# Patient Record
Sex: MALE | Race: WHITE | NOT HISPANIC OR LATINO | Employment: UNEMPLOYED | URBAN - METROPOLITAN AREA
[De-identification: names, ages, dates, MRNs, and addresses within clinical notes are randomized per-mention and may not be internally consistent; named-entity substitution may affect disease eponyms.]

---
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
## 2024-01-03 ENCOUNTER — PATIENT OUTREACH (OUTPATIENT)
Dept: ADMINISTRATIVE | Facility: HOSPITAL | Age: 24
End: 2024-01-03
Payer: COMMERCIAL

## 2024-01-31 ENCOUNTER — OFFICE VISIT (OUTPATIENT)
Dept: INTERNAL MEDICINE | Facility: CLINIC | Age: 24
End: 2024-01-31
Payer: COMMERCIAL

## 2024-01-31 VITALS
SYSTOLIC BLOOD PRESSURE: 139 MMHG | WEIGHT: 189.63 LBS | BODY MASS INDEX: 27.15 KG/M2 | DIASTOLIC BLOOD PRESSURE: 79 MMHG | HEART RATE: 63 BPM | HEIGHT: 70 IN | OXYGEN SATURATION: 99 %

## 2024-01-31 DIAGNOSIS — F90.0 ATTENTION DEFICIT HYPERACTIVITY DISORDER (ADHD), PREDOMINANTLY INATTENTIVE TYPE: ICD-10-CM

## 2024-01-31 DIAGNOSIS — Z00.00 ROUTINE HEALTH MAINTENANCE: Primary | ICD-10-CM

## 2024-01-31 PROCEDURE — 3078F DIAST BP <80 MM HG: CPT | Mod: CPTII,S$GLB,, | Performed by: FAMILY MEDICINE

## 2024-01-31 PROCEDURE — 1159F MED LIST DOCD IN RCRD: CPT | Mod: CPTII,S$GLB,, | Performed by: FAMILY MEDICINE

## 2024-01-31 PROCEDURE — 3008F BODY MASS INDEX DOCD: CPT | Mod: CPTII,S$GLB,, | Performed by: FAMILY MEDICINE

## 2024-01-31 PROCEDURE — 99385 PREV VISIT NEW AGE 18-39: CPT | Mod: S$GLB,,, | Performed by: FAMILY MEDICINE

## 2024-01-31 PROCEDURE — 99999 PR PBB SHADOW E&M-EST. PATIENT-LVL III: CPT | Mod: PBBFAC,,, | Performed by: FAMILY MEDICINE

## 2024-01-31 PROCEDURE — 3075F SYST BP GE 130 - 139MM HG: CPT | Mod: CPTII,S$GLB,, | Performed by: FAMILY MEDICINE

## 2024-01-31 RX ORDER — METHYLPHENIDATE HYDROCHLORIDE 10 MG/1
10 TABLET ORAL 2 TIMES DAILY
Qty: 30 TABLET | Refills: 0 | Status: SHIPPED | OUTPATIENT
Start: 2024-01-31 | End: 2024-04-01 | Stop reason: SDUPTHER

## 2024-01-31 RX ORDER — METHYLPHENIDATE HYDROCHLORIDE 72 MG/1
1 TABLET, EXTENDED RELEASE ORAL DAILY
Qty: 30 TABLET | Refills: 0 | Status: SHIPPED | OUTPATIENT
Start: 2024-01-31 | End: 2024-02-29

## 2024-01-31 NOTE — PROGRESS NOTES
"Subjective:       Patient ID: Efraín Curtis is a 23 y.o. male.    Chief Complaint: Establish Care    HPI  Came in today to establish care with me.  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  Has had ADHD in on treatment since he was 14 years old.  He originally is from the state of [place].  Was seen a pediatrician there.    Has taken extended-release methylphenidate on a daily basis and at times will do an as needed lower dose of immediate release methylphenidate.    All of your core healthy metrics are met.      Social History     Social History Narrative    Not on file       History reviewed. No pertinent family history.    Current Outpatient Medications:     methylphenidate HCl (RITALIN) 10 MG tablet, Take 1 tablet (10 mg total) by mouth 2 (two) times daily., Disp: 30 tablet, Rfl: 0    methylphenidate HCl 72 mg TR24, Take 1 each by mouth once daily., Disp: 30 tablet, Rfl: 0    Review of Systems   Constitutional:  Negative for chills and fever.   Eyes:  Negative for visual disturbance.   Respiratory:  Negative for cough and shortness of breath.    Cardiovascular:  Negative for chest pain.   Gastrointestinal:  Negative for abdominal pain.   Neurological:  Negative for dizziness.       Objective:   /79 (BP Location: Left arm, Patient Position: Sitting)   Pulse 63   Ht 5' 10" (1.778 m)   Wt 86 kg (189 lb 9.5 oz)   SpO2 99%   BMI 27.20 kg/m²      Physical Exam  Vitals reviewed.   Constitutional:       Appearance: He is well-developed.   HENT:      Head: Normocephalic and atraumatic.   Eyes:      Conjunctiva/sclera: Conjunctivae normal.   Cardiovascular:      Rate and Rhythm: Normal rate.   Pulmonary:      Effort: Pulmonary effort is normal. No respiratory distress.   Skin:     General: Skin is warm and dry.      Findings: No rash.   Neurological:      Mental Status: He is alert and oriented to person, place, and time.      Coordination: Coordination normal.   Psychiatric:         Behavior: Behavior normal.       "   Assessment & Plan     Problem List Items Addressed This Visit          Psychiatric    Attention deficit hyperactivity disorder (ADHD), predominantly inattentive type    Current Assessment & Plan     Continue on medications that patient reports he was taking and prescribed by pediatrician in [place]..  I requested last progress note from that clinic.         Relevant Medications    methylphenidate HCl 72 mg TR24    methylphenidate HCl (RITALIN) 10 MG tablet       Other    Routine health maintenance - Primary    Current Assessment & Plan     No other health concerns at this time.  Ordered routine labs that he deferred for now.         Relevant Orders    Hepatitis C Antibody    HIV 1/2 Ag/Ab (4th Gen)    Lipid Panel    Comprehensive Metabolic Panel    CBC Auto Differential         Immunizations Administered on Date of Encounter - 1/31/2024       No immunizations on file.             No follow-ups on file.      Disclaimer:  This note may have been prepared using voice recognition software, it may have not been extensively proofed, as such there could be errors within the text such as sound alike errors.

## 2024-01-31 NOTE — ASSESSMENT & PLAN NOTE
Continue on medications that patient reports he was taking and prescribed by pediatrician in [place]..  I requested last progress note from that clinic.

## 2024-02-29 DIAGNOSIS — F90.0 ATTENTION DEFICIT HYPERACTIVITY DISORDER (ADHD), PREDOMINANTLY INATTENTIVE TYPE: ICD-10-CM

## 2024-02-29 RX ORDER — METHYLPHENIDATE HYDROCHLORIDE 72 MG/1
1 TABLET, EXTENDED RELEASE ORAL 2 TIMES DAILY
Qty: 60 TABLET | Refills: 0 | Status: CANCELLED | OUTPATIENT
Start: 2024-02-29

## 2024-02-29 RX ORDER — METHYLPHENIDATE HYDROCHLORIDE 36 MG/1
36 TABLET ORAL 2 TIMES DAILY
Qty: 60 TABLET | Refills: 0 | Status: SHIPPED | OUTPATIENT
Start: 2024-02-29 | End: 2024-03-28 | Stop reason: SDUPTHER

## 2024-02-29 NOTE — TELEPHONE ENCOUNTER
No care due was identified.  [facility] Embedded Care Due Messages. Reference number: [id number].   2/29/2024 1:06:10 PM CST

## 2024-03-05 ENCOUNTER — TELEPHONE (OUTPATIENT)
Dept: INTERNAL MEDICINE | Facility: CLINIC | Age: 24
End: 2024-03-05
Payer: COMMERCIAL

## 2024-03-05 DIAGNOSIS — Z11.1 SCREENING EXAMINATION FOR PULMONARY TUBERCULOSIS: Primary | ICD-10-CM

## 2024-03-05 NOTE — TELEPHONE ENCOUNTER
----- Message from Darlene Cm sent at 3/4/2024  6:57 PM CST -----  Type:  Sooner Apoointment Request    Caller is requesting a sooner appointment.  Caller declined first available appointment listed below.  Caller will not accept being placed on the waitlist and is requesting a message be sent to doctor.  Name of Caller:pt  Would the patient rather a call back or a response via [facility]? call  Best Call Back Number: [phone]  Additional Information: tb test

## 2024-03-05 NOTE — TELEPHONE ENCOUNTER
----- Message from Raven Tiwari sent at 3/5/2024  9:33 AM CST -----  Regarding: Returning call  Type:  Patient Returning Call    Who Called: p/t     Who Left Message for Patient:  gallitojorge nurson     Does the patient know what this is regarding?: yes    Would the patient rather a call back or a response via [facility]?     Best Call Back Number: [phone]    Additional Information:

## 2024-03-05 NOTE — TELEPHONE ENCOUNTER
Called pt back   Spoke w/ pt  He requested a TB blood test  Pended/routing to provider to sign then will contact pt to sched  They verbalized understanding and had no further concerns or questions.

## 2024-03-20 ENCOUNTER — TELEPHONE (OUTPATIENT)
Dept: INTERNAL MEDICINE | Facility: CLINIC | Age: 24
End: 2024-03-20
Payer: COMMERCIAL

## 2024-03-21 ENCOUNTER — LAB VISIT (OUTPATIENT)
Dept: LAB | Facility: HOSPITAL | Age: 24
End: 2024-03-21
Attending: FAMILY MEDICINE
Payer: COMMERCIAL

## 2024-03-21 DIAGNOSIS — Z00.00 ROUTINE HEALTH MAINTENANCE: ICD-10-CM

## 2024-03-21 PROCEDURE — 86480 TB TEST CELL IMMUN MEASURE: CPT | Performed by: FAMILY MEDICINE

## 2024-03-22 LAB
GAMMA INTERFERON BACKGROUND BLD IA-ACNC: 0 IU/ML
M TB IFN-G CD4+ BCKGRND COR BLD-ACNC: 0.01 IU/ML
M TB IFN-G CD4+ BCKGRND COR BLD-ACNC: 0.03 IU/ML
MITOGEN IGNF BCKGRD COR BLD-ACNC: 10 IU/ML
TB GOLD PLUS: NEGATIVE

## 2024-03-28 DIAGNOSIS — F90.0 ATTENTION DEFICIT HYPERACTIVITY DISORDER (ADHD), PREDOMINANTLY INATTENTIVE TYPE: Primary | ICD-10-CM

## 2024-03-29 NOTE — TELEPHONE ENCOUNTER
No care due was identified.  [facility] Embedded Care Due Messages. Reference number: [id number].   3/28/2024 7:35:19 PM CDT

## 2024-04-01 RX ORDER — METHYLPHENIDATE HYDROCHLORIDE 36 MG/1
36 TABLET ORAL 2 TIMES DAILY
Qty: 60 TABLET | Refills: 0 | Status: SHIPPED | OUTPATIENT
Start: 2024-04-01 | End: 2024-05-09 | Stop reason: SDUPTHER

## 2024-05-06 PROBLEM — Z00.00 ROUTINE HEALTH MAINTENANCE: Status: RESOLVED | Noted: 2024-01-31 | Resolved: 2024-05-06

## 2024-05-09 DIAGNOSIS — F90.0 ATTENTION DEFICIT HYPERACTIVITY DISORDER (ADHD), PREDOMINANTLY INATTENTIVE TYPE: ICD-10-CM

## 2024-05-09 RX ORDER — METHYLPHENIDATE HYDROCHLORIDE 36 MG/1
36 TABLET ORAL 2 TIMES DAILY
Qty: 60 TABLET | Refills: 0 | Status: SHIPPED | OUTPATIENT
Start: 2024-05-09 | End: 2024-06-05 | Stop reason: SDUPTHER

## 2024-06-05 DIAGNOSIS — F90.0 ATTENTION DEFICIT HYPERACTIVITY DISORDER (ADHD), PREDOMINANTLY INATTENTIVE TYPE: ICD-10-CM

## 2024-06-05 RX ORDER — METHYLPHENIDATE HYDROCHLORIDE 36 MG/1
36 TABLET ORAL 2 TIMES DAILY
Qty: 60 TABLET | Refills: 0 | Status: SHIPPED | OUTPATIENT
Start: 2024-06-05

## 2024-06-05 NOTE — TELEPHONE ENCOUNTER
No care due was identified.  [facility] Embedded Care Due Messages. Reference number: [id number].   6/05/2024 8:46:51 AM CDT

## 2024-06-10 ENCOUNTER — PATIENT MESSAGE (OUTPATIENT)
Dept: INTERNAL MEDICINE | Facility: CLINIC | Age: 24
End: 2024-06-10
Payer: COMMERCIAL

## 2024-07-01 ENCOUNTER — PATIENT MESSAGE (OUTPATIENT)
Dept: INTERNAL MEDICINE | Facility: CLINIC | Age: 24
End: 2024-07-01
Payer: COMMERCIAL

## 2024-07-01 DIAGNOSIS — F90.0 ATTENTION DEFICIT HYPERACTIVITY DISORDER (ADHD), PREDOMINANTLY INATTENTIVE TYPE: ICD-10-CM

## 2024-07-01 RX ORDER — METHYLPHENIDATE HYDROCHLORIDE 36 MG/1
36 TABLET ORAL 2 TIMES DAILY
Qty: 60 TABLET | Refills: 0 | Status: SHIPPED | OUTPATIENT
Start: 2024-07-01

## 2024-07-01 NOTE — TELEPHONE ENCOUNTER
No care due was identified.  [facility] Embedded Care Due Messages. Reference number: [id number].   7/01/2024 10:41:40 AM CDT

## 2024-08-23 DIAGNOSIS — F90.0 ATTENTION DEFICIT HYPERACTIVITY DISORDER (ADHD), PREDOMINANTLY INATTENTIVE TYPE: ICD-10-CM

## 2024-08-23 RX ORDER — METHYLPHENIDATE HYDROCHLORIDE 36 MG/1
36 TABLET ORAL 2 TIMES DAILY
Qty: 60 TABLET | Refills: 0 | Status: SHIPPED | OUTPATIENT
Start: 2024-08-23

## 2024-09-06 ENCOUNTER — PATIENT MESSAGE (OUTPATIENT)
Dept: ADMINISTRATIVE | Facility: OTHER | Age: 24
End: 2024-09-06
Payer: COMMERCIAL

## 2024-09-06 ENCOUNTER — PATIENT MESSAGE (OUTPATIENT)
Dept: INTERNAL MEDICINE | Facility: CLINIC | Age: 24
End: 2024-09-06
Payer: COMMERCIAL

## 2024-09-06 RX ORDER — METHYLPHENIDATE HYDROCHLORIDE 10 MG/1
10 TABLET ORAL 2 TIMES DAILY
Qty: 30 TABLET | Refills: 0 | Status: SHIPPED | OUTPATIENT
Start: 2024-09-06

## 2024-09-06 NOTE — TELEPHONE ENCOUNTER
No care due was identified.  [facility] Embedded Care Due Messages. Reference number: [id number].   9/06/2024 11:32:33 AM CDT

## 2024-09-07 ENCOUNTER — ON-DEMAND VIRTUAL (OUTPATIENT)
Dept: URGENT CARE | Facility: CLINIC | Age: 24
End: 2024-09-07
Payer: COMMERCIAL

## 2024-09-07 DIAGNOSIS — U07.1 COVID-19: Primary | ICD-10-CM

## 2024-09-07 PROCEDURE — 99214 OFFICE O/P EST MOD 30 MIN: CPT | Mod: 95,,, | Performed by: NURSE PRACTITIONER

## 2024-09-07 RX ORDER — NIRMATRELVIR AND RITONAVIR 300-100 MG
KIT ORAL
Qty: 30 TABLET | Refills: 0 | Status: SHIPPED | OUTPATIENT
Start: 2024-09-07

## 2024-09-07 NOTE — PROGRESS NOTES
Subjective:      Patient ID: Efraín Curtis is a 23 y.o. male.    Vitals:  vitals were not taken for this visit.     Chief Complaint: COVID-19 Concerns      Visit Type: TELE AUDIOVISUAL    Present with the patient at the time of consultation: TELEMED PRESENT WITH PATIENT: None, at home    History reviewed. No pertinent past medical history.  History reviewed. No pertinent surgical history.  Review of patient's allergies indicates:  No Known Allergies  Current Outpatient Medications on File Prior to Visit   Medication Sig Dispense Refill    methylphenidate HCl (RITALIN) 10 MG tablet Take 1 tablet (10 mg total) by mouth 2 (two) times daily. 30 tablet 0    methylphenidate HCl 36 MG CR tablet Take 1 tablet (36 mg total) by mouth 2 (two) times a day. 60 tablet 0     No current facility-administered medications on file prior to visit.     No family history on file.    Medications Ordered                [facility]/pharmacy #[id number] - [place], [place] - [address]   [address] [address]    Telephone: [phone]   Fax: [phone]   Hours: Not open 24 hours                         E-Prescribed (1 of 1)              nirmatrelvir-ritonavir (PAXLOVID) 300 mg (150 mg x 2)-100 mg copackaged tablets (EUA)    Sig: Take 3 tablets by mouth 2 (two) times daily. Each dose contains 2 nirmatrelvir (pink tablets) and 1 ritonavir (white tablet). Take all 3 tablets together       Start: 9/7/24     Quantity: 30 tablet Refills: 0                           [facility] Peq Odvv Intake    9/7/2024 11:59 AM CDT - Filed by Patient   What is your current physical address in the event of a medical emergency? [address] [place], [place], [address]   Are you able to take your vital signs? No   Please attach any relevant images or files          COVID positive. Symptom onset 3 days. Taking Tylenol. Seeking further treatment.        Constitution: Positive for chills and fever.   HENT:  Negative for ear pain, congestion, postnasal drip and sore  throat.    Cardiovascular:  Positive for sob on exertion.   Respiratory:  Positive for cough and shortness of breath. Negative for wheezing.    Gastrointestinal:  Positive for nausea and vomiting (post-tussive).   Musculoskeletal:  Positive for muscle ache.        Objective:   The physical exam was conducted virtually.  Physical Exam   Constitutional: He is oriented to person, place, and time. He does not appear ill. No distress.   HENT:   Head: Normocephalic and atraumatic.   Nose: Nose normal.   Eyes: Extraocular movement intact   Pulmonary/Chest: Effort normal.   Abdominal: Normal appearance.   Musculoskeletal: Normal range of motion.         General: Normal range of motion.   Neurological: no focal deficit. He is alert and oriented to person, place, and time.   Psychiatric: His behavior is normal. Mood normal.   Vitals reviewed.      Assessment:     1. COVID-19        Plan:   Patient encouraged to monitor symptoms closely and instructed to follow-up for new or worsening symptoms. Further, in-person, evaluation may be necessary for continued treatment. Please follow up with your primary care doctor or specialist as needed. Verbally discussed plan. Patient confirms understanding and is in agreement with treatment and plan.     You must understand that you've received a Virtual Care evaluation only and that you may be released before all your medical problems are known or treated. You, the patient, will arrange for follow up care as instructed.      COVID-19  -     nirmatrelvir-ritonavir (PAXLOVID) 300 mg (150 mg x 2)-100 mg copackaged tablets (EUA); Take 3 tablets by mouth 2 (two) times daily. Each dose contains 2 nirmatrelvir (pink tablets) and 1 ritonavir (white tablet). Take all 3 tablets together  Dispense: 30 tablet; Refill: 0      Patient Instructions       Antiviral medication discussed as alternate treatment. Risks and benefits discussed. Side effects discussed. Medical history reviewed with patient, and  medications reviewed for interactions. Patient has no other contraindications to taking this medication. Patient has agreed to taking Paxlovid and prescription was sent. You have 5 days from symptom onset to start the medication for maximum benefit. Recommend follow-up with Primary Care for continued care.      OVER THE COUNTER RECOMMENDATIONS/SUGGESTIONS.      ·         Make sure to stay well hydrated.     ·         Use Nasal Saline to mechanically move any post nasal drip from your eustachian tube or from the back of your throat.     ·         Use warm saltwater gargles to ease your throat pain. Warm saltwater gargles as needed for sore throat-  1/2 tsp salt to 1 cup warm water, gargle as desired.     ·         Use an antihistamine such as Claritin, Zyrtec or Allegra to dry you out.     ·         Use pseudoephedrine (behind the counter) to decongest. Pseudoephedrine  30 mg up to 240 mg /day. It can raise your blood pressure and give you palpitations.     ·         Use Mucinex (guaifenisin) to break up mucous up to 2400mg/day to loosen any mucous.     ·         The Mucinex DM pill has a cough suppressant that can be sedating. It can be used at night to stop the tickle at the back of your throat.     ·         You can use Mucinex D (it has guaifenesin and a high dose of pseudoephedrine) in the mornings to help decongest.     ·         Use Afrin (oxymetazoline) in each nare for no longer than 3 days, as it is addictive. It can also dry out your mucous membranes and cause elevated blood pressure. This is especially useful if you are flying.     ·         Use Flonase 1-2 sprays/nostril per day. It is a local acting steroid nasal spray, if you develop a bloody nose, stop using the medication immediately.     ·         Sometimes Nyquil at night is beneficial to help you get some rest, however it is sedating, and it does have an antihistamine, and Tylenol.     ·         Honey is a natural cough suppressant that can be  used.     ·         Tylenol up to 4,000 mg a day is safe for short periods and can be used for body aches, pain, and fever. However, in high doses and prolonged use it can cause liver irritation.     ·         Ibuprofen is a non-steroidal anti-inflammatory that can be used for body aches, pain, and fever. However, it can also cause stomach irritation if overused.     Here are some useful tips:     COVID-19 Self Care and Symptom Monitoring Program Now Available in [facility]     For community members seeking a COVID-19 test, we strongly recommend at-home testing, whether they are experiencing symptoms or want peace of mind knowing their COVID status. At-home tests can be purchased at major retailers and some local health departments are also giving away at-home testing kits to residents. The rapid at-home test kits are reliable, and many are the same tests [facility] utilizes in various healthcare settings.     If a patient recently tested positive for COVID-19, they can easily enroll in [facility]s free self-care and symptom monitoring program. By completing a quick form in [facility], they will be automatically registered into our COVID-19 Self Care and Symptom Monitoring Program. If symptoms worsen, our care team will be there to guide them on next steps and treatment options.     Click on this tip sheet for the COVID-19 Self Care and Symptom Monitoring Program.       The Urgent cares have stopped asymptomatic testing due to the volumes of symptomatic patients - we strongly recommend at-home testing.     Patient Advice     Schedule your booster shot now to protect yourself, your friends and your family.     Assess your individual personal risk before attending any event-outdoor gatherings are best!     Wear your mask unless you are actively eating or drinking.     Wash your hands frequently, cough or sneeze into your elbow or tissue.     Following potential exposure from travel or holiday parties, test for infection 3 to  5 days later with an at home test.     International travelers should be tested regardless of symptoms, vaccination status or history of COVID-19 infection 3 to 5 days after return.     If you test positive for COVID-19 while you are traveling, view this website to learn about treatments available to you where you are     Quarantine Guidelines:        Updated guidelines will be as follows:    If you test positive for COVID-19 you may return to normal activities when, for at least 24 hours, both are true:    Your symptoms are getting better overall, and:  You have not had a fever AND are not using fever reducing medication     The CDC also recommends added precautions in the 5 days after return to normal activity including frequent hand washing, mask wearing, physical distancing.      They also recommend should the patient develop a fever or starts to feel worse after they have returned to normal activities, they should return home and away from others for at least another 24 hours. The link below is a direct link to the [facility] with all this information.     https://www.cdc.gov/respiratory-viruses/prevention/precautions-when-sick.html         Sites for community testing     https://[place]h.[place].gov/index.cfm/page/3934?clearCache=1        https://[email or web address]/testing     ·         As you know the urgent cares, emergency rooms and primary care offices are starting to get busier with the rapid rise of COVID and as such [facility] is taking walk ups for testing:      §  Check-in via [facility] account or by calling [phone] upon arrival to be checked in remotely      §  Testing Available: PCR Testing (NOT Rapid Test)        Dexamethasone/Corticosteroids     Recommendation Against the Use of Corticosteroids to Treat Outpatients with COVID-19 and Recommendations for Vaccination of Persons with Known or Previous COVID-19 Infection            https://www.dllwq53vbihzkvarqbsppmiwqb.nih.gov/management/clinical-management/nonhospitalized-adults--therapeutic-management/      ·         COVID-19 vaccination and SARS-CoV-2 infection     https://www.cdc.gov/vaccines/covid-19/clinical-considerations/covid-19-vaccines-[place].html#CoV-19-vaccination

## 2024-09-07 NOTE — PATIENT INSTRUCTIONS
Antiviral medication discussed as alternate treatment. Risks and benefits discussed. Side effects discussed. Medical history reviewed with patient, and medications reviewed for interactions. Patient has no other contraindications to taking this medication. Patient has agreed to taking Paxlovid and prescription was sent. You have 5 days from symptom onset to start the medication for maximum benefit. Recommend follow-up with Primary Care for continued care.      OVER THE COUNTER RECOMMENDATIONS/SUGGESTIONS.      ·         Make sure to stay well hydrated.     ·         Use Nasal Saline to mechanically move any post nasal drip from your eustachian tube or from the back of your throat.     ·         Use warm saltwater gargles to ease your throat pain. Warm saltwater gargles as needed for sore throat-  1/2 tsp salt to 1 cup warm water, gargle as desired.     ·         Use an antihistamine such as Claritin, Zyrtec or Allegra to dry you out.     ·         Use pseudoephedrine (behind the counter) to decongest. Pseudoephedrine  30 mg up to 240 mg /day. It can raise your blood pressure and give you palpitations.     ·         Use Mucinex (guaifenisin) to break up mucous up to 2400mg/day to loosen any mucous.     ·         The Mucinex DM pill has a cough suppressant that can be sedating. It can be used at night to stop the tickle at the back of your throat.     ·         You can use Mucinex D (it has guaifenesin and a high dose of pseudoephedrine) in the mornings to help decongest.     ·         Use Afrin (oxymetazoline) in each nare for no longer than 3 days, as it is addictive. It can also dry out your mucous membranes and cause elevated blood pressure. This is especially useful if you are flying.     ·         Use Flonase 1-2 sprays/nostril per day. It is a local acting steroid nasal spray, if you develop a bloody nose, stop using the medication immediately.     ·         Sometimes Nyquil at night is beneficial to help  you get some rest, however it is sedating, and it does have an antihistamine, and Tylenol.     ·         Honey is a natural cough suppressant that can be used.     ·         Tylenol up to 4,000 mg a day is safe for short periods and can be used for body aches, pain, and fever. However, in high doses and prolonged use it can cause liver irritation.     ·         Ibuprofen is a non-steroidal anti-inflammatory that can be used for body aches, pain, and fever. However, it can also cause stomach irritation if overused.     Here are some useful tips:     COVID-19 Self Care and Symptom Monitoring Program Now Available in [facility]     For community members seeking a COVID-19 test, we strongly recommend at-home testing, whether they are experiencing symptoms or want peace of mind knowing their COVID status. At-home tests can be purchased at major retailers and some local health departments are also giving away at-home testing kits to residents. The rapid at-home test kits are reliable, and many are the same tests [facility] utilizes in various healthcare settings.     If a patient recently tested positive for COVID-19, they can easily enroll in [facility]s free self-care and symptom monitoring program. By completing a quick form in [facility], they will be automatically registered into our COVID-19 Self Care and Symptom Monitoring Program. If symptoms worsen, our care team will be there to guide them on next steps and treatment options.     Click on this tip sheet for the COVID-19 Self Care and Symptom Monitoring Program.       The Urgent cares have stopped asymptomatic testing due to the volumes of symptomatic patients - we strongly recommend at-home testing.     Patient Advice     Schedule your booster shot now to protect yourself, your friends and your family.     Assess your individual personal risk before attending any event-outdoor gatherings are best!     Wear your mask unless you are actively eating or drinking.     Wash  your hands frequently, cough or sneeze into your elbow or tissue.     Following potential exposure from travel or holiday parties, test for infection 3 to 5 days later with an at home test.     International travelers should be tested regardless of symptoms, vaccination status or history of COVID-19 infection 3 to 5 days after return.     If you test positive for COVID-19 while you are traveling, view this website to learn about treatments available to you where you are     Quarantine Guidelines:        Updated guidelines will be as follows:    If you test positive for COVID-19 you may return to normal activities when, for at least 24 hours, both are true:    Your symptoms are getting better overall, and:  You have not had a fever AND are not using fever reducing medication     The CDC also recommends added precautions in the 5 days after return to normal activity including frequent hand washing, mask wearing, physical distancing.      They also recommend should the patient develop a fever or starts to feel worse after they have returned to normal activities, they should return home and away from others for at least another 24 hours. The link below is a direct link to the CDC with all this information.     https://www.cdc.gov/respiratory-viruses/prevention/precautions-when-sick.html         Sites for community testing     https://ldh.[place].gov/index.cfm/page/3934?clearCache=1        https://www[facility].org/testing     ·         As you know the urgent cares, emergency rooms and primary care offices are starting to get busier with the rapid rise of COVID and as such [facility] is taking walk ups for testing:      §  Check-in via [facility] account or by calling [phone] upon arrival to be checked in remotely      §  Testing Available: PCR Testing (NOT Rapid Test)        Dexamethasone/Corticosteroids     Recommendation Against the Use of Corticosteroids to Treat Outpatients with COVID-19 and Recommendations for  Vaccination of Persons with Known or Previous COVID-19 Infection           https://www.sqbji16bzuyyqmnhmremfkjytp.nih.gov/management/clinical-management/nonhospitalized-adults--therapeutic-management/      ·         COVID-19 vaccination and SARS-CoV-2 infection     https://www.cdc.gov/vaccines/covid-19/clinical-considerations/covid-19-vaccines-[place].html#CoV-19-vaccination

## 2024-09-28 DIAGNOSIS — F90.0 ATTENTION DEFICIT HYPERACTIVITY DISORDER (ADHD), PREDOMINANTLY INATTENTIVE TYPE: ICD-10-CM

## 2024-09-28 NOTE — TELEPHONE ENCOUNTER
No care due was identified.  [facility] Embedded Care Due Messages. Reference number: [id number].   9/28/2024 5:52:00 PM CDT

## 2024-09-30 ENCOUNTER — E-VISIT (OUTPATIENT)
Dept: INTERNAL MEDICINE | Facility: CLINIC | Age: 24
End: 2024-09-30
Payer: COMMERCIAL

## 2024-09-30 DIAGNOSIS — F90.0 ATTENTION DEFICIT HYPERACTIVITY DISORDER (ADHD), PREDOMINANTLY INATTENTIVE TYPE: Primary | ICD-10-CM

## 2024-09-30 PROCEDURE — 99499 UNLISTED E&M SERVICE: CPT | Mod: ,,, | Performed by: FAMILY MEDICINE

## 2024-09-30 RX ORDER — METHYLPHENIDATE HYDROCHLORIDE 36 MG/1
36 TABLET ORAL 2 TIMES DAILY
Qty: 60 TABLET | Refills: 0 | Status: SHIPPED | OUTPATIENT
Start: 2024-09-30

## 2024-11-15 DIAGNOSIS — F90.0 ATTENTION DEFICIT HYPERACTIVITY DISORDER (ADHD), PREDOMINANTLY INATTENTIVE TYPE: ICD-10-CM

## 2024-11-15 RX ORDER — METHYLPHENIDATE HYDROCHLORIDE 36 MG/1
36 TABLET ORAL 2 TIMES DAILY
Qty: 60 TABLET | Refills: 0 | Status: SHIPPED | OUTPATIENT
Start: 2024-11-15

## 2024-11-15 NOTE — TELEPHONE ENCOUNTER
No care due was identified.  [facility] Embedded Care Due Messages. Reference number: [id number].   11/15/2024 9:51:41 AM CST

## 2024-12-01 DIAGNOSIS — F90.0 ATTENTION DEFICIT HYPERACTIVITY DISORDER (ADHD), PREDOMINANTLY INATTENTIVE TYPE: Primary | ICD-10-CM

## 2024-12-01 NOTE — TELEPHONE ENCOUNTER
No care due was identified.  [facility] Embedded Care Due Messages. Reference number: [id number].   12/01/2024 2:17:34 PM CST

## 2024-12-02 RX ORDER — METHYLPHENIDATE HYDROCHLORIDE 10 MG/1
10 TABLET ORAL 2 TIMES DAILY
Qty: 30 TABLET | Refills: 0 | Status: SHIPPED | OUTPATIENT
Start: 2024-12-02

## 2025-01-02 ENCOUNTER — PATIENT MESSAGE (OUTPATIENT)
Dept: INTERNAL MEDICINE | Facility: CLINIC | Age: 25
End: 2025-01-02
Payer: COMMERCIAL

## 2025-02-03 ENCOUNTER — OFFICE VISIT (OUTPATIENT)
Dept: INTERNAL MEDICINE | Facility: CLINIC | Age: 25
End: 2025-02-03
Payer: COMMERCIAL

## 2025-02-03 ENCOUNTER — LAB VISIT (OUTPATIENT)
Dept: LAB | Facility: OTHER | Age: 25
End: 2025-02-03
Attending: FAMILY MEDICINE
Payer: COMMERCIAL

## 2025-02-03 VITALS
DIASTOLIC BLOOD PRESSURE: 68 MMHG | OXYGEN SATURATION: 99 % | WEIGHT: 194.25 LBS | HEIGHT: 70 IN | HEART RATE: 68 BPM | SYSTOLIC BLOOD PRESSURE: 138 MMHG | BODY MASS INDEX: 27.81 KG/M2

## 2025-02-03 DIAGNOSIS — Z00.00 ROUTINE HEALTH MAINTENANCE: ICD-10-CM

## 2025-02-03 DIAGNOSIS — F90.0 ATTENTION DEFICIT HYPERACTIVITY DISORDER (ADHD), PREDOMINANTLY INATTENTIVE TYPE: ICD-10-CM

## 2025-02-03 DIAGNOSIS — Z00.00 ROUTINE HEALTH MAINTENANCE: Primary | ICD-10-CM

## 2025-02-03 LAB
ALBUMIN SERPL BCP-MCNC: 4.5 G/DL (ref 3.5–5.2)
ALP SERPL-CCNC: 56 U/L (ref 40–150)
ALT SERPL W/O P-5'-P-CCNC: 30 U/L (ref 10–44)
ANION GAP SERPL CALC-SCNC: 10 MMOL/L (ref 8–16)
AST SERPL-CCNC: 20 U/L (ref 10–40)
BASOPHILS # BLD AUTO: 0.02 K/UL (ref 0–0.2)
BASOPHILS NFR BLD: 0.4 % (ref 0–1.9)
BILIRUB SERPL-MCNC: 0.5 MG/DL (ref 0.1–1)
BUN SERPL-MCNC: 12 MG/DL (ref 6–20)
CALCIUM SERPL-MCNC: 9.6 MG/DL (ref 8.7–10.5)
CHLORIDE SERPL-SCNC: 105 MMOL/L (ref 95–110)
CHOLEST SERPL-MCNC: 186 MG/DL (ref 120–199)
CHOLEST/HDLC SERPL: 3.5 {RATIO} (ref 2–5)
CO2 SERPL-SCNC: 24 MMOL/L (ref 23–29)
CREAT SERPL-MCNC: 0.8 MG/DL (ref 0.5–1.4)
DIFFERENTIAL METHOD BLD: NORMAL
EOSINOPHIL # BLD AUTO: 0.1 K/UL (ref 0–0.5)
EOSINOPHIL NFR BLD: 1.9 % (ref 0–8)
ERYTHROCYTE [DISTWIDTH] IN BLOOD BY AUTOMATED COUNT: 11.6 % (ref 11.5–14.5)
EST. GFR  (NO RACE VARIABLE): >60 ML/MIN/1.73 M^2
GLUCOSE SERPL-MCNC: 90 MG/DL (ref 70–110)
HCT VFR BLD AUTO: 43 % (ref 40–54)
HCV AB SERPL QL IA: NEGATIVE
HDLC SERPL-MCNC: 53 MG/DL (ref 40–75)
HDLC SERPL: 28.5 % (ref 20–50)
HGB BLD-MCNC: 15.2 G/DL (ref 14–18)
HIV 1+2 AB+HIV1 P24 AG SERPL QL IA: NEGATIVE
IMM GRANULOCYTES # BLD AUTO: 0.02 K/UL (ref 0–0.04)
IMM GRANULOCYTES NFR BLD AUTO: 0.4 % (ref 0–0.5)
LDLC SERPL CALC-MCNC: 115.4 MG/DL (ref 63–159)
LYMPHOCYTES # BLD AUTO: 1.6 K/UL (ref 1–4.8)
LYMPHOCYTES NFR BLD: 35.3 % (ref 18–48)
MCH RBC QN AUTO: 30.3 PG (ref 27–31)
MCHC RBC AUTO-ENTMCNC: 35.3 G/DL (ref 32–36)
MCV RBC AUTO: 86 FL (ref 82–98)
MONOCYTES # BLD AUTO: 0.3 K/UL (ref 0.3–1)
MONOCYTES NFR BLD: 6.9 % (ref 4–15)
NEUTROPHILS # BLD AUTO: 2.6 K/UL (ref 1.8–7.7)
NEUTROPHILS NFR BLD: 55.1 % (ref 38–73)
NONHDLC SERPL-MCNC: 133 MG/DL
NRBC BLD-RTO: 0 /100 WBC
PLATELET # BLD AUTO: 188 K/UL (ref 150–450)
PMV BLD AUTO: 10.7 FL (ref 9.2–12.9)
POTASSIUM SERPL-SCNC: 4.5 MMOL/L (ref 3.5–5.1)
PROT SERPL-MCNC: 7.6 G/DL (ref 6–8.4)
RBC # BLD AUTO: 5.01 M/UL (ref 4.6–6.2)
SODIUM SERPL-SCNC: 139 MMOL/L (ref 136–145)
TRIGL SERPL-MCNC: 88 MG/DL (ref 30–150)
WBC # BLD AUTO: 4.64 K/UL (ref 3.9–12.7)

## 2025-02-03 PROCEDURE — 1159F MED LIST DOCD IN RCRD: CPT | Mod: CPTII,S$GLB,, | Performed by: FAMILY MEDICINE

## 2025-02-03 PROCEDURE — 90480 ADMN SARSCOV2 VAC 1/ONLY CMP: CPT | Mod: S$GLB,,, | Performed by: FAMILY MEDICINE

## 2025-02-03 PROCEDURE — 80061 LIPID PANEL: CPT | Performed by: FAMILY MEDICINE

## 2025-02-03 PROCEDURE — 86803 HEPATITIS C AB TEST: CPT | Performed by: FAMILY MEDICINE

## 2025-02-03 PROCEDURE — 36415 COLL VENOUS BLD VENIPUNCTURE: CPT | Performed by: FAMILY MEDICINE

## 2025-02-03 PROCEDURE — 99999 PR PBB SHADOW E&M-EST. PATIENT-LVL III: CPT | Mod: PBBFAC,,, | Performed by: FAMILY MEDICINE

## 2025-02-03 PROCEDURE — 80053 COMPREHEN METABOLIC PANEL: CPT | Performed by: FAMILY MEDICINE

## 2025-02-03 PROCEDURE — 90715 TDAP VACCINE 7 YRS/> IM: CPT | Mod: S$GLB,,, | Performed by: FAMILY MEDICINE

## 2025-02-03 PROCEDURE — 3075F SYST BP GE 130 - 139MM HG: CPT | Mod: CPTII,S$GLB,, | Performed by: FAMILY MEDICINE

## 2025-02-03 PROCEDURE — 91320 SARSCV2 VAC 30MCG TRS-SUC IM: CPT | Mod: S$GLB,,, | Performed by: FAMILY MEDICINE

## 2025-02-03 PROCEDURE — 85025 COMPLETE CBC W/AUTO DIFF WBC: CPT | Performed by: FAMILY MEDICINE

## 2025-02-03 PROCEDURE — 3078F DIAST BP <80 MM HG: CPT | Mod: CPTII,S$GLB,, | Performed by: FAMILY MEDICINE

## 2025-02-03 PROCEDURE — 90471 IMMUNIZATION ADMIN: CPT | Mod: S$GLB,,, | Performed by: FAMILY MEDICINE

## 2025-02-03 PROCEDURE — 3008F BODY MASS INDEX DOCD: CPT | Mod: CPTII,S$GLB,, | Performed by: FAMILY MEDICINE

## 2025-02-03 PROCEDURE — 87389 HIV-1 AG W/HIV-1&-2 AB AG IA: CPT | Performed by: FAMILY MEDICINE

## 2025-02-03 PROCEDURE — 99395 PREV VISIT EST AGE 18-39: CPT | Mod: 25,S$GLB,, | Performed by: FAMILY MEDICINE

## 2025-02-03 RX ORDER — METHYLPHENIDATE HYDROCHLORIDE 18 MG/1
18 TABLET ORAL EVERY MORNING
Qty: 30 TABLET | Refills: 0 | Status: SHIPPED | OUTPATIENT
Start: 2025-02-03

## 2025-02-03 NOTE — PROGRESS NOTES
After obtaining consent, and per orders of Dr. Morales, injection of COMIRNATY  Lot [id number]  Exp 04/10/2025  given in the LD  by Guy Branch LPN. Patient tolerated well and band aid applied. Patient instructed to remain in clinic for 15 minutes afterwards, and to report any adverse reaction to me immediately.      After obtaining consent, and per orders of Dr. Morales, injection of Tdap  Lot [id number]  Exp 01/25/2027  given in the RD  by Guy Branch LPN. Patient tolerated well and band aid applied. Patient instructed to remain in clinic for 15 minutes afterwards, and to report any adverse reaction to me immediately.

## 2025-02-03 NOTE — PATIENT INSTRUCTIONS
Found this list from local dispensary listing some medical marijuana providers:    [email or web address]

## 2025-02-09 NOTE — PROGRESS NOTES
"Subjective:       Patient ID: Efraín Curtis is a 24 y.o. male.    Chief Complaint: Annual Exam    HPI  History of Present Illness    CHIEF COMPLAINT:  Efraín presents today for annual checkup.    MEDICATIONS:  He currently takes Ritalin 10 mg IR and Concerta 36 mg twice daily. He reports decreased appetite and insomnia as side effects. He desires to decrease Concerta dosage due to reduced academic demands.    SOCIAL HISTORY:  He consumes alcohol 1-2 drinks, once or twice per month. He uses marijuana 4-5 times per week at night, equally distributed between smoking and edibles. He is sexually active with women. He denies tobacco use and vaping.    MEDICAL HISTORY:  He denies any new medical conditions, recent surgeries, or medication allergies.      ROS:  General: -fever, -chills, -fatigue, -weight gain, -weight loss, +loss of appetite  Eyes: -vision changes, -redness, -discharge  ENT: -ear pain, -nasal congestion, -sore throat  Cardiovascular: -chest pain, -palpitations, -lower extremity edema  Respiratory: -cough, -shortness of breath  Gastrointestinal: -abdominal pain, -nausea, -vomiting, -diarrhea, -constipation, -blood in stool  Genitourinary: -dysuria, -hematuria, -frequency  Musculoskeletal: -joint pain, -muscle pain  Skin: -rash, -lesion  Neurological: -headache, -dizziness, -numbness, -tingling  Psychiatric: -anxiety, -depression, -sleep difficulty           All of your core healthy metrics are met.      Social History     Social History Narrative    Not on file       No family history on file.    Current Outpatient Medications:     methylphenidate HCl 18 MG CR tablet, Take 1 tablet (18 mg total) by mouth every morning., Disp: 30 tablet, Rfl: 0    Review of Systems    Objective:   /68 (BP Location: Left arm, Patient Position: Sitting)   Pulse 68   Ht 5' 10" (1.778 m)   Wt 88.1 kg (194 lb 3.6 oz)   SpO2 99%   BMI 27.87 kg/m²      Physical Exam  Vitals reviewed.   Constitutional:       Appearance: He " is well-developed.   HENT:      Head: Normocephalic and atraumatic.   Eyes:      Conjunctiva/sclera: Conjunctivae normal.   Cardiovascular:      Rate and Rhythm: Normal rate.   Pulmonary:      Effort: Pulmonary effort is normal. No respiratory distress.   Skin:     General: Skin is warm and dry.      Findings: No rash.   Neurological:      Mental Status: He is alert and oriented to person, place, and time.      Coordination: Coordination normal.   Psychiatric:         Behavior: Behavior normal.         Physical Exam              @resultssec@  Assessment & Plan   Assessment & Plan    IMPRESSION:  - Considered patient's request to decrease Concerta dosage due to reduced academic demands  - Recommend maintaining once-daily Concerta 36 mg as easiest maintenance option  - Advised against using 18 mg extended-release as an alternative option  - Determined tetanus vaccine due and appropriate to administer  - Discussed COVID-19 booster eligibility, pending confirmation of fall vaccination    PLAN SUMMARY:   Ordered hepatitis C screening, cholesterol screening, and HIV testing   Ordered COVID-19 vaccines and tetanus vaccine   Administered tetanus vaccine   Decreased Concerta from 36 mg twice daily to 36 mg daily   Discontinued Ritalin 10 mg immediate release   Follow up after patient confirms COVID-19 booster status   Offered STI screening   Provided resources for medical marijuana recommendations    ATTENTION DEFICIT HYPERACTIVITY DISORDER (ADHD):   Decreased Concerta from 36 mg twice daily to 36 mg daily.   Discontinued Ritalin 10 mg immediate release.   Discussed reducing Concerta dosage due to decreased current academic demands.   Efraín reported side effects of appetite suppression and insomnia with current medication regimen.   Recommend reducing Concerta to daily dosing of 36 mg extended release.    MARIJUANA USE:   Efraín uses marijuana 4-5 times per week, primarily at night, in both smoked and edible forms.    Provided resources for medical marijuana recommendations upon patient's inquiry.    TOBACCO USE:   Efraín denies using tobacco products.    SEXUAL HEALTH:   Offered STI screening to the sexually active patient.   Ordered HIV screening.   Ordered hepatitis C screening.    TETANUS VACCINATION:   Ordered tetanus vaccine.   Administered tetanus vaccine.    COVID-19 VACCINATION:   Ordered COVID-19 vaccines.   Instructed the patient to confirm if COVID-19 booster was received on campus in the fall.   Follow up after patient checks and confirms COVID-19 booster status.    LABS:   Ordered labs including cholesterol screening and HIV testing.    OTHER INSTRUCTIONS:   Performed physical exam including auscultation of lungs, inspection of mouth and ears.   Found no apparent abnormalities during physical exam.   Reviewed patient's medical and social history.         Problem List Items Addressed This Visit          Psychiatric    Attention deficit hyperactivity disorder (ADHD), predominantly inattentive type    Relevant Medications    methylphenidate HCl 18 MG CR tablet       Other    Routine health maintenance - Primary         Immunizations Administered on Date of Encounter - 2/3/2025       Name Date Dose VIS Date Route Exp Date    COVID-19, mRNA, LNP-S, PF, octavio-sucrose, 30 mcg/0.3 mL (Pfizer 2023 Ages 12+) 2/3/2025 11:53 AM 0.3 mL 10/19/2023 Intramuscular 4/10/2025    Site: Right deltoid     Given By: Guy Branch LPN     : [employer]     Lot: [id number]     TDAP 2/3/2025 11:55 AM 0.5 mL 8/6/2021 Intramuscular 1/25/2027    Site: Left deltoid     Given By: Guy Branch LPN     : [employer]     Lot: [id number]              No follow-ups on file.    This note was generated with the assistance of ambient listening technology. Verbal consent was obtained by the patient and accompanying visitor(s) for the recording of patient appointment to facilitate this note. I attest to having reviewed and  edited the generated note for accuracy, though some syntax or spelling errors may persist. Please contact the author of this note for any clarification.      Disclaimer:  This note may have been prepared using voice recognition software, it may have not been extensively proofed, as such there could be errors within the text such as sound alike errors.

## 2025-02-17 DIAGNOSIS — F90.0 ATTENTION DEFICIT HYPERACTIVITY DISORDER (ADHD), PREDOMINANTLY INATTENTIVE TYPE: ICD-10-CM

## 2025-02-17 NOTE — TELEPHONE ENCOUNTER
No care due was identified.  [employer] Embedded Care Due Messages. Reference number: [id number].   2/17/2025 5:09:37 PM CST

## 2025-02-18 NOTE — TELEPHONE ENCOUNTER
Refill Encounter    PCP Visits: Recent Visits  Date Type Provider Dept   02/03/25 Office Visit Vick Morales DO [facility] Internal Medicine   Showing recent visits within past 360 days and meeting all other requirements  Future Appointments  No visits were found meeting these conditions.  Showing future appointments within next 720 days and meeting all other requirements      Last 3 Blood Pressure:   BP Readings from Last 3 Encounters:   02/03/25 138/68   01/31/24 139/79     Preferred Pharmacy:   [id number] - [place], [place] - [address]  [address] [address]  Phone: [phone] Fax: [phone]    Requested RX:  Requested Prescriptions     Pending Prescriptions Disp Refills    methylphenidate HCl 18 MG CR tablet 30 tablet 0     Sig: Take 1 tablet (18 mg total) by mouth every morning.      RX Route: Normal

## 2025-02-19 RX ORDER — METHYLPHENIDATE HYDROCHLORIDE 18 MG/1
18 TABLET ORAL EVERY MORNING
Qty: 30 TABLET | Refills: 0 | Status: SHIPPED | OUTPATIENT
Start: 2025-02-19

## 2025-08-02 ENCOUNTER — PATIENT MESSAGE (OUTPATIENT)
Dept: INTERNAL MEDICINE | Facility: CLINIC | Age: 25
End: 2025-08-02
Payer: COMMERCIAL

## 2025-08-22 ENCOUNTER — PATIENT MESSAGE (OUTPATIENT)
Dept: INTERNAL MEDICINE | Facility: CLINIC | Age: 25
End: 2025-08-22
Payer: COMMERCIAL

## 2025-09-03 ENCOUNTER — OFFICE VISIT (OUTPATIENT)
Dept: INTERNAL MEDICINE | Facility: CLINIC | Age: 25
End: 2025-09-03
Payer: COMMERCIAL

## 2025-09-03 DIAGNOSIS — F90.0 ATTENTION DEFICIT HYPERACTIVITY DISORDER (ADHD), PREDOMINANTLY INATTENTIVE TYPE: Primary | ICD-10-CM

## 2025-09-03 PROCEDURE — 98006 SYNCH AUDIO-VIDEO EST MOD 30: CPT | Mod: 95,,, | Performed by: FAMILY MEDICINE

## 2025-09-03 PROCEDURE — 1159F MED LIST DOCD IN RCRD: CPT | Mod: CPTII,95,, | Performed by: FAMILY MEDICINE

## 2025-09-03 PROCEDURE — G2211 COMPLEX E/M VISIT ADD ON: HCPCS | Mod: 95,,, | Performed by: FAMILY MEDICINE

## 2025-09-03 RX ORDER — METHYLPHENIDATE HYDROCHLORIDE 10 MG/1
10 TABLET ORAL DAILY PRN
Qty: 30 TABLET | Refills: 0 | Status: SHIPPED | OUTPATIENT
Start: 2025-09-03

## 2025-09-03 RX ORDER — METHYLPHENIDATE HYDROCHLORIDE 10 MG/1
10 TABLET ORAL DAILY PRN
COMMUNITY
Start: 2016-01-17 | End: 2025-09-03 | Stop reason: SDUPTHER

## 2025-09-03 RX ORDER — METHYLPHENIDATE HYDROCHLORIDE 36 MG/1
36 TABLET ORAL 2 TIMES DAILY
Qty: 60 TABLET | Refills: 0 | Status: SHIPPED | OUTPATIENT
Start: 2025-09-03